# Patient Record
Sex: MALE | Race: WHITE | NOT HISPANIC OR LATINO | URBAN - METROPOLITAN AREA
[De-identification: names, ages, dates, MRNs, and addresses within clinical notes are randomized per-mention and may not be internally consistent; named-entity substitution may affect disease eponyms.]

---

## 2024-01-27 ENCOUNTER — HOSPITAL ENCOUNTER (EMERGENCY)
Facility: HOSPITAL | Age: 29
Discharge: OTHER INSTITUTION | End: 2024-01-27
Attending: STUDENT IN AN ORGANIZED HEALTH CARE EDUCATION/TRAINING PROGRAM
Payer: COMMERCIAL

## 2024-01-27 ENCOUNTER — APPOINTMENT (OUTPATIENT)
Dept: CARDIOLOGY | Facility: HOSPITAL | Age: 29
End: 2024-01-27
Payer: COMMERCIAL

## 2024-01-27 VITALS
TEMPERATURE: 98.1 F | BODY MASS INDEX: 24.37 KG/M2 | HEIGHT: 72 IN | SYSTOLIC BLOOD PRESSURE: 138 MMHG | HEART RATE: 132 BPM | OXYGEN SATURATION: 99 % | DIASTOLIC BLOOD PRESSURE: 84 MMHG | RESPIRATION RATE: 20 BRPM | WEIGHT: 179.9 LBS

## 2024-01-27 DIAGNOSIS — F19.10 SUBSTANCE ABUSE (MULTI): Primary | ICD-10-CM

## 2024-01-27 LAB
ALBUMIN SERPL-MCNC: 4.6 G/DL (ref 3.5–5)
ALP BLD-CCNC: 67 U/L (ref 35–125)
ALT SERPL-CCNC: 11 U/L (ref 5–40)
AMPHETAMINES UR QL SCN>1000 NG/ML: POSITIVE
ANION GAP SERPL CALC-SCNC: 16 MMOL/L
APAP SERPL-MCNC: <5 UG/ML
AST SERPL-CCNC: 35 U/L (ref 5–40)
BARBITURATES UR QL SCN>300 NG/ML: NEGATIVE
BASOPHILS # BLD AUTO: 0.06 X10*3/UL (ref 0–0.1)
BASOPHILS NFR BLD AUTO: 0.4 %
BENZODIAZ UR QL SCN>300 NG/ML: NEGATIVE
BILIRUB SERPL-MCNC: 0.5 MG/DL (ref 0.1–1.2)
BUN SERPL-MCNC: 26 MG/DL (ref 8–25)
BZE UR QL SCN>300 NG/ML: POSITIVE
CALCIUM SERPL-MCNC: 9.4 MG/DL (ref 8.5–10.4)
CANNABINOIDS UR QL SCN>50 NG/ML: POSITIVE
CHLORIDE SERPL-SCNC: 103 MMOL/L (ref 97–107)
CO2 SERPL-SCNC: 21 MMOL/L (ref 24–31)
CREAT SERPL-MCNC: 1.3 MG/DL (ref 0.4–1.6)
EGFRCR SERPLBLD CKD-EPI 2021: 77 ML/MIN/1.73M*2
EOSINOPHIL # BLD AUTO: 0 X10*3/UL (ref 0–0.7)
EOSINOPHIL NFR BLD AUTO: 0 %
ERYTHROCYTE [DISTWIDTH] IN BLOOD BY AUTOMATED COUNT: 13.1 % (ref 11.5–14.5)
ETHANOL SERPL-MCNC: <0.01 G/DL
FENTANYL+NORFENTANYL UR QL SCN: NEGATIVE
FLUAV RNA RESP QL NAA+PROBE: NOT DETECTED
FLUBV RNA RESP QL NAA+PROBE: NOT DETECTED
GLUCOSE SERPL-MCNC: 131 MG/DL (ref 65–99)
HCT VFR BLD AUTO: 42.2 % (ref 41–52)
HGB BLD-MCNC: 14.2 G/DL (ref 13.5–17.5)
IMM GRANULOCYTES # BLD AUTO: 0.09 X10*3/UL (ref 0–0.7)
IMM GRANULOCYTES NFR BLD AUTO: 0.5 % (ref 0–0.9)
LYMPHOCYTES # BLD AUTO: 2.92 X10*3/UL (ref 1.2–4.8)
LYMPHOCYTES NFR BLD AUTO: 17.7 %
MCH RBC QN AUTO: 30.5 PG (ref 26–34)
MCHC RBC AUTO-ENTMCNC: 33.6 G/DL (ref 32–36)
MCV RBC AUTO: 91 FL (ref 80–100)
METHADONE UR QL SCN>300 NG/ML: NEGATIVE
MONOCYTES # BLD AUTO: 1.54 X10*3/UL (ref 0.1–1)
MONOCYTES NFR BLD AUTO: 9.3 %
NEUTROPHILS # BLD AUTO: 11.88 X10*3/UL (ref 1.2–7.7)
NEUTROPHILS NFR BLD AUTO: 72.1 %
NRBC BLD-RTO: 0 /100 WBCS (ref 0–0)
OPIATES UR QL SCN>300 NG/ML: NEGATIVE
OXYCODONE UR QL: NEGATIVE
PCP UR QL SCN>25 NG/ML: NEGATIVE
PLATELET # BLD AUTO: 277 X10*3/UL (ref 150–450)
POTASSIUM SERPL-SCNC: 3.5 MMOL/L (ref 3.4–5.1)
PROT SERPL-MCNC: 7.4 G/DL (ref 5.9–7.9)
RBC # BLD AUTO: 4.66 X10*6/UL (ref 4.5–5.9)
SALICYLATES SERPL-MCNC: <0 MG/DL
SARS-COV-2 RNA RESP QL NAA+PROBE: NOT DETECTED
SODIUM SERPL-SCNC: 140 MMOL/L (ref 133–145)
WBC # BLD AUTO: 16.5 X10*3/UL (ref 4.4–11.3)

## 2024-01-27 PROCEDURE — 80320 DRUG SCREEN QUANTALCOHOLS: CPT

## 2024-01-27 PROCEDURE — 90839 PSYTX CRISIS INITIAL 60 MIN: CPT

## 2024-01-27 PROCEDURE — 85025 COMPLETE CBC W/AUTO DIFF WBC: CPT

## 2024-01-27 PROCEDURE — 36415 COLL VENOUS BLD VENIPUNCTURE: CPT

## 2024-01-27 PROCEDURE — 93005 ELECTROCARDIOGRAM TRACING: CPT

## 2024-01-27 PROCEDURE — 84075 ASSAY ALKALINE PHOSPHATASE: CPT

## 2024-01-27 PROCEDURE — 99285 EMERGENCY DEPT VISIT HI MDM: CPT

## 2024-01-27 PROCEDURE — 2500000001 HC RX 250 WO HCPCS SELF ADMINISTERED DRUGS (ALT 637 FOR MEDICARE OP): Performed by: EMERGENCY MEDICINE

## 2024-01-27 PROCEDURE — 80307 DRUG TEST PRSMV CHEM ANLYZR: CPT

## 2024-01-27 PROCEDURE — 87636 SARSCOV2 & INF A&B AMP PRB: CPT

## 2024-01-27 RX ORDER — LORAZEPAM 1 MG/1
2 TABLET ORAL ONCE
Status: COMPLETED | OUTPATIENT
Start: 2024-01-27 | End: 2024-01-27

## 2024-01-27 RX ORDER — LORAZEPAM 2 MG/ML
2 INJECTION INTRAMUSCULAR ONCE
Status: DISCONTINUED | OUTPATIENT
Start: 2024-01-27 | End: 2024-01-27

## 2024-01-27 RX ADMIN — LORAZEPAM 2 MG: 1 TABLET ORAL at 21:38

## 2024-01-27 SDOH — HEALTH STABILITY: MENTAL HEALTH: BEHAVIORS/MOOD: PLEASANT;COOPERATIVE;CALM;DELUSIONS;HALLUCINATIONS

## 2024-01-27 SDOH — ECONOMIC STABILITY: HOUSING INSECURITY: FEELS SAFE LIVING IN HOME: OTHER (COMMENT)

## 2024-01-27 SDOH — HEALTH STABILITY: MENTAL HEALTH: ANXIETY SYMPTOMS: NO PROBLEMS REPORTED OR OBSERVED.

## 2024-01-27 SDOH — HEALTH STABILITY: MENTAL HEALTH: HALLUCINATION: AUDITORY;VISUAL

## 2024-01-27 SDOH — SOCIAL STABILITY: SOCIAL NETWORK: PARENT/GUARDIAN/SIGNIFICANT OTHER INVOLVEMENT: NO INVOLVEMENT

## 2024-01-27 SDOH — HEALTH STABILITY: MENTAL HEALTH: HAVE YOU WISHED YOU WERE DEAD OR WISHED YOU COULD GO TO SLEEP AND NOT WAKE UP?: NO

## 2024-01-27 SDOH — HEALTH STABILITY: MENTAL HEALTH: SUICIDE ASSESSMENT: ADULT (C-SSRS)

## 2024-01-27 SDOH — HEALTH STABILITY: MENTAL HEALTH: HAVE YOU EVER DONE ANYTHING, STARTED TO DO ANYTHING, OR PREPARED TO DO ANYTHING TO END YOUR LIFE?: NO

## 2024-01-27 SDOH — HEALTH STABILITY: MENTAL HEALTH: HAVE YOU ACTUALLY HAD ANY THOUGHTS OF KILLING YOURSELF?: NO

## 2024-01-27 SDOH — HEALTH STABILITY: MENTAL HEALTH: DEPRESSION SYMPTOMS: NO PROBLEMS REPORTED OR OBSERVED.

## 2024-01-27 ASSESSMENT — LIFESTYLE VARIABLES
SUBSTANCE_ABUSE_PAST_12_MONTHS: YES
PRESCIPTION_ABUSE_PAST_12_MONTHS: NO
EVER FELT BAD OR GUILTY ABOUT YOUR DRINKING: NO
HAVE PEOPLE ANNOYED YOU BY CRITICIZING YOUR DRINKING: NO
EVER HAD A DRINK FIRST THING IN THE MORNING TO STEADY YOUR NERVES TO GET RID OF A HANGOVER: NO
HAVE YOU EVER FELT YOU SHOULD CUT DOWN ON YOUR DRINKING: NO
REASON UNABLE TO ASSESS: NO

## 2024-01-27 ASSESSMENT — COLUMBIA-SUICIDE SEVERITY RATING SCALE - C-SSRS
6. HAVE YOU EVER DONE ANYTHING, STARTED TO DO ANYTHING, OR PREPARED TO DO ANYTHING TO END YOUR LIFE?: NO
2. HAVE YOU ACTUALLY HAD ANY THOUGHTS OF KILLING YOURSELF?: NO
1. IN THE PAST MONTH, HAVE YOU WISHED YOU WERE DEAD OR WISHED YOU COULD GO TO SLEEP AND NOT WAKE UP?: NO

## 2024-01-27 ASSESSMENT — PAIN - FUNCTIONAL ASSESSMENT: PAIN_FUNCTIONAL_ASSESSMENT: 0-10

## 2024-01-27 ASSESSMENT — PAIN SCALES - GENERAL: PAINLEVEL_OUTOF10: 0 - NO PAIN

## 2024-01-27 NOTE — PROGRESS NOTES
EPAT - Social Work Psychiatric Assessment    Arrival Details  Mode of Arrival: Other (Comment) (Police)  Admission Source: Other (Comment) (Pulled over in the community)  Admission Type: Voluntary  EPAT Assessment Start Date: 01/27/24  EPAT Assessment Start Time: 1200  Name of : Manasa Coburn    History of Present Illness  Admission Reason: Police (PT is accused of inducing panic at a local bank and possibly alluding to wanting to commit a robbery. PT was found by police in the community and had ingested an unknown subtance in front of the officers.)  HPI: SW reviewed records and met with pt to obtain history. Due to altered mental status- mental history was unable to be assessed. PT is documented as schizophrenic and has recieved services through [facility] although it appears he is currently non-compliant- his last visit was from October 2023. From previous records it appears that pt has had similar episodes of psychosis and delusions.     Readmission Information   Readmission within 30 Days: No    Psychiatric Symptoms  Anxiety Symptoms: No problems reported or observed.  Depression Symptoms: No problems reported or observed.  Gia Symptoms: Flight of ideas, Grandiosity, Increased energy    Psychosis Symptoms  Hallucination Type: No problems reported or observed.  Delusion Type: Thought insertion, Grandeur, Mind reading    Additional Symptoms - Adult  Generalized Anxiety Disorder: No problems reported or observed.  Obsessive Compulsive Disorder: No problems reported or observed.  Panic Attack: No problems reported or observed.  Post Traumatic Stress Disorder: No problems reported or observed.  Delirium: Acute inattention    Past Psychiatric History/Meds/Treatments  Past Psychiatric History: PT is documented as schizophrenic. Pt reported that he has providers through [facility] however records indicate that he as not been seen in the last three months for services.  Past Psychiatric  "Meds/Treatments: [place], 2023 and [facility] 2022    Current Mental Health Contacts   Name/Phone Number: None  Provider Name/Phone Number: None    Support System:  (Pt stated that his \"paw paw\" and his dog \"tugger\" are his supports. When asked clarifying questions about his \"paw paw\" he was unable to clairfy.)    Living Arrangement:  (When asked about living arrangements, pt stated that he lives with \"a lot of people\" and \"hes never home alone\" but could not provide more information.)    Home Safety  Feels Safe Living in Home: Other (Comment) (Unable to assess)         [facility] Service/Education History  Current or Previous  Service: None         Legal  Criminal Activity/ Legal Involvement Pertinent to Current Situation/ Hospitalization: PT was brought in by PD- charges are pending as a result of inducing panic in public.    Drug Screening  Have you used any substances (canabis, cocaine, heroin, hallucinogens, inhalants, etc.) in the past 12 months?: Yes  Have you used any prescription drugs other than prescribed in the past 12 months?: No  Is a toxicology screen needed?: Yes    Stage of Change  Stage of Change: Precontemplation    Psychosocial  Psychosocial (WDL): Exceptions to WDL  Behaviors/Mood: Appears intoxicated, Calm, Cooperative, Delusions, Impulsive  Affect: Inconsistent with thought content  Parent/Guardian/Significant Other Involvement: No involvement    Orientation  Orientation Level: Disoriented to place, Disoriented to person    General Appearance  Motor Activity: Unremarkable  Speech Pattern: Rapid, Word salad, Pressured  General Attitude: Cooperative  Appearance/Hygiene: Body odor    Thought Process  Coherency: Flight of ideas, Disorganized, Loose associations, Tangential  Content: Delusions  Delusions: Grandeur  Perception: Hallucinations  Hallucination: Visual  Judgment/Insight: Poor  Confusion: Moderate  Cognition: Poor attention/concentration    Sleep Pattern  Sleep " Pattern: Unable to assess    Risk Factors  Self Harm/Suicidal Ideation Plan: None reported  Previous Self Harm/Suicidal Plans: None reported    Violence Risk Assessment  Assessment of Violence: None noted  Thoughts of Harm to Others: No    Ability to Assess Risk Screen  Risk Screen - Ability to Assess: Unable to assess  Step 1: Risk Factors  Current & Past Psychiatric Dx: Psychotic disorder, Mood disorder, Alcohol/substance abuse disorders  Presenting Symptoms: Psychosis  Change in Treatment: Non-compliant or not receiving treatment  Access to L[employer] : Other (Comment) (Unable to assess)  Step 3: Suicidal Ideation Intensity  Most Severe Suicidal Ideation Identified: Unable to assess  Step 5: Documentation  Risk Level: Low suicide risk    Psychiatric Impression and Plan of Care  Assessment and Plan: SW met with pt. Pt was not well oriented- he reported that he believed this year to be 1972. He also stated that he believes that he was in a movie  Pt overall seemed gradiose and was experiencing phychosis. PT tested positive for THC, coacaine and amphetamines. Assessment qauality was limited due to pt's altereed mental status.  Plan Comments: SW consulted with attending provider and RN. Disposition reccomendation is unclear at this time. Will be dependent on pt's mental status rapidly resolving due to drug effects and if he would not meet psych criteria. If not imporving symptoms, will consider inpatient psych admission.    Outcome/Disposition  Patient's Perception of Outcome Achieved: Unable to assess  Assessment, Recommendations and Risk Level Reviewed with: Dr. Ahmet COVINGTON, Danielle Thurman PA-C, Becky OTT  Contact Name: N/A  EPAT Assessment Completed Date: 01/27/24  EPAT Assessment Completed Time: 1948

## 2024-01-27 NOTE — PROGRESS NOTES
"Social Work Note    SW attempted to engage pt in conversation about how he is feeling.  Pt reported feeling \"elevated\".  SW inquired about what elevated feels like to pt.  Pt replied by telling SW a story about brownie pie in his mother's refrigerator. SW found pt to be presenting with disorganized speech, needing redirection after every question and mumbling nonsensically.  Pt was not able to give a direct answer to any of the questions SW asked. When asked a question pt would give answers that had no connection to the question asked. Pt presented as having auditory hallucinations, asking SW if she hears things in the hallway.  SW told pt that yes, there are voices outside his door to which pt stated, \"no, I hear more than that\". SW inquired about pt mental health history, hospitalizations and diagnosis to which pt reports he has none.  Pt reports no AH/VH at this time and denies SI/HI.  Pt reports having things to look forward to but when asked by SW what he is looking forward to he stated, \"planet fitness\". After SW left the room, pt was clearly talking to himself loud enough to be heard at the nurses station.  After speaking with the pt, SW reports pt mental status has not improved since being admitted to the ED.  Pt is still presenting disorganized and his thinking is non-liner. SW will also note that pt presentation does not match up with methamphetamine induced psychosis. Pt is not being forthcoming with his mental health history which leads SW to believe he has poor insight and from the report EPD gave, pt is presenting with poor judgement.      "

## 2024-01-27 NOTE — ED PROVIDER NOTES
HPI   Chief Complaint   Patient presents with    Drug Overdose       Patient is a 28-year-old male presenting to the emergency department for evaluation of possible ingestion.  Reportedly PD was called by a local bank due to a gentleman in the bank saying that he was going to nola it and saying things about having cameras in a vault.  The patient was pulled over by PD and was arrested.  Apparently they went to arrest him at length like he had possibly ingested a substance.  He then became more agitated after being arrested and therefore PD was concerned for overdose.  Patient states he thought that they were filming a movie and the  were part of the act.  Patient denies any homicidal or suicidal ideations.                          Chelsea Coma Scale Score: 15                  Patient History   Past Medical History:   Diagnosis Date    Schizophrenia (CMS/[facility])     Substance abuse (CMS/[facility])      History reviewed. No pertinent surgical history.  No family history on file.  Social History     Tobacco Use    Smoking status: Never    Smokeless tobacco: Never   Substance Use Topics    Alcohol use: Not on file    Drug use: Not on file       Physical Exam   ED Triage Vitals [01/27/24 1124]   Temperature Heart Rate Respirations BP   36.6 °C (97.9 °F) (!) 150 20 (!) 156/98      Pulse Ox Temp src Heart Rate Source Patient Position   99 % -- Monitor --      BP Location FiO2 (%)     -- --       Physical Exam  Vitals and nursing note reviewed.   Constitutional:       General: He is not in acute distress.     Appearance: Normal appearance. He is normal weight. He is not ill-appearing or toxic-appearing.      Comments: Disheveled appearing   HENT:      Head: Normocephalic and atraumatic.      Nose: Nose normal.      Mouth/Throat:      Mouth: Mucous membranes are moist.   Eyes:      Extraocular Movements: Extraocular movements intact.      Pupils: Pupils are equal, round, and reactive to light.   Cardiovascular:      Rate and  Rhythm: Regular rhythm. Tachycardia present.      Pulses: Normal pulses.      Heart sounds: Normal heart sounds. No murmur heard.     No friction rub. No gallop.   Pulmonary:      Effort: Pulmonary effort is normal.      Breath sounds: Normal breath sounds.   Musculoskeletal:         General: Normal range of motion.      Cervical back: Normal range of motion.   Skin:     General: Skin is warm and dry.   Neurological:      General: No focal deficit present.      Mental Status: He is alert and oriented to person, place, and time.   Psychiatric:         Speech: Speech is rapid and pressured.         Behavior: Behavior is hyperactive.         Thought Content: Thought content is delusional. Thought content does not include homicidal or suicidal ideation.      Comments: Appears to be internally stimulated         ED Course & MDM   ED Course as of 01/27/24 1750   Sat Jan 27, 2024   1030 EKG presented to me at 10:30 AM     EKG as interpreted by me shows normal sinus tachycardia at a rate of 147 bpm, likely left ventricular hypertrophy, no STEMI, normal axis.   [DH]   1037 Attending MDM:  28-year-old male past medical history of questionable psychiatric disease presents to the emergency room with possible ingestion.      Independent historian: Reportedly PD notes that they were called for a gentleman in a bank saying things like do have cameras in the vault and he believes himself to be in a movie and he is making a lot of different claims.  When they arrested him patient ingested an unknown substance and since then has become more agitated.  Patient otherwise denying any suicidal or homicidal ideation at this time and otherwise appears delusional and seems to be responding to internal stimuli.    [unfilled]  Vital signs as interpreted by me: Tachycardic    Constitutional: No acute distress. Resting comfortably.   Head: Normocephalic, atraumatic.   Eyes: Pupils dilated bilaterally, minimally reactive to light, EOM  grossly intact, conjunctiva normal.  Mouth/Throat: Oropharynx is clear, moist mucus membranes.   Neck: Supple. No lymphadenopathy.  Cardiovascular: Regular rate and regular rhythm. Extremities are well-perfused.    Pulmonary/Chest: No respiratory distress, breathing comfortably on room air.    Abdominal: Soft, non-tender, non-distended. No rebound or guarding.   Musculoskeletal: No lower extremity edema.       Skin: Warm, dry, and intact.   Neurological: Patient is oriented to person, place, time, and situation. Face symmetric, hearing intact to voice, speech normal. Moves all extremities.   Psych: Agitated mood, agitated affect, thought content bizarre, and judgment questionable.    Differential includes but not limited to:  Drug intoxication versus drug withdrawal versus acute jade [DH]      ED Course User Index  [DH] Donte Leo MD         Diagnoses as of 01/27/24 1750   Substance abuse (CMS/[facility])       Medical Decision Making  Parts of this chart have been completed using voice recognition software. Please excuse any errors of transcription. Despite the medical decision making time stamp above-my medical decision making has taken place during the patient's entire visit. My thought process and reason for plan has been formulated from the time that I saw the patient until the time of disposition and is not specific to one specific moment during their visit and furthermore my MDM encompasses this entire chart and not only this text box.    Patient seen in conjunction with attending physician Dr. Leo.    HPI: Detailed above.    Exam: A medically appropriate exam performed, outlined above, given the known history and presentation.    History obtained from: Patient and PD    EKG: Reviewed and interpreted by my attending physician    Labs/Diagnostics:  Labs Reviewed   CBC WITH AUTO DIFFERENTIAL - Abnormal       Result Value    WBC 16.5 (*)     nRBC 0.0      RBC 4.66      Hemoglobin 14.2      Hematocrit 42.2      MCV 91       MCH 30.5      MCHC 33.6      RDW 13.1      Platelets 277      Neutrophils % 72.1      Immature Granulocytes %, Automated 0.5      Lymphocytes % 17.7      Monocytes % 9.3      Eosinophils % 0.0      Basophils % 0.4      Neutrophils Absolute 11.88 (*)     Immature Granulocytes Absolute, Automated 0.09      Lymphocytes Absolute 2.92      Monocytes Absolute 1.54 (*)     Eosinophils Absolute 0.00      Basophils Absolute 0.06     COMPREHENSIVE METABOLIC PANEL - Abnormal    Glucose 131 (*)     Sodium 140      Potassium 3.5      Chloride 103      Bicarbonate 21 (*)     Urea Nitrogen 26 (*)     Creatinine 1.30      eGFR 77      Calcium 9.4      Albumin 4.6      Alkaline Phosphatase 67      Total Protein 7.4      AST 35      Bilirubin, Total 0.5      ALT 11      Anion Gap 16     DRUG SCREEN,URINE - Abnormal    Amphetamine Screen, Urine Positive (*)     Barbiturate Screen, Urine Negative      Benzodiazepines Screen, Urine Negative      Cannabinoid Screen, Urine Positive (*)     Cocaine Metabolite Screen, Urine Positive (*)     Fentanyl Screen, Urine Negative      Methadone Screen, Urine Negative      Opiate Screen, Urine Negative      Oxycodone Screen, Urine Negative      PCP Screen, Urine Negative      Narrative:     These toxicological screening tests provide unconfirmed qualitative measurements to aid in treatment and diagnosis in cases of drug use or overdose. This test is used only for medical purposes. A positive result does not indicate or measure intoxication. For specific test performance or pathologist consultation, please contact the Laboratory.    The following threshold concentrations are used for these analyses.Values at or above the threshold concentration are reported as positive. Values below the threshold are reported as negative.    Drug /Screening Threshold                                                                                                 THC/CANNABINOIDS................50  ng/ml  METHADONE......................300 ng/ml  COCAINE METABOLITES............300 ng/ml  BENZODIAZEPINE.................300 ng/ml  PCP.............................25 ng/ml  OPIATE.........................300 ng/ml  AMPHETAMINE/ECSTASY...........1000 ng/ml  BARBITURATE....................200 ng/ml  OXYCODONE......................100 ng/ml  FENTANYL.........................5 ng/ml       ACUTE TOXICOLOGY PANEL, BLOOD - Normal    Acetaminophen <5.0      Salicylate  <0      Alcohol <0.010     SARS-COV-2 AND INFLUENZA A/B PCR - Normal    Flu A Result Not Detected      Flu B Result Not Detected      Coronavirus 2019, PCR Not Detected      Narrative:     This assay has received FDA Emergency Use Authorization (EUA) and  is only authorized for the duration of time that circumstances exist to justify the authorization of the emergency use of in vitro diagnostic tests for the detection of SARS-CoV-2 virus and/or diagnosis of COVID-19 infection under section 564(b)(1) of the Act, 21 U.S.C. 360bbb-3(b)(1). Testing for SARS-CoV-2 is only recommended for patients who meet current clinical and/or epidemiological criteria as defined by federal, state, or local public health directives. This assay is an in vitro diagnostic nucleic acid amplification test for the qualitative detection of SARS-CoV-2, Influenza A, and Influenza B from nasopharyngeal specimens and has been validated for use at [facility]. Negative results do not preclude COVID-19 infections or Influenza A/B infections, and should not be used as the sole basis for diagnosis, treatment, or other management decisions. If Influenza A/B and RSV PCR results are negative, testing for Parainfluenza virus, Adenovirus and Metapneumovirus is routinely performed for [facility] pediatric oncology and intensive care inpatients, and is available on other patients by placing an add-on request.      EMERGENCY DEPARTMENT COURSE and DIFFERENTIAL DIAGNOSIS/MDM:  Patient  is a 28-year-old male presenting to the emergency department for evaluation of possible overdose.  On physical exam vital signs remarkable for hypertension and tachycardia but otherwise stable.  Patient appears internally stimulated and delusional.  Diagnostic labs ordered as well as social work consult.  Patient tested negative for COVID and flu.  She has a positive in his urine for amphetamines, cannabinoid, and cocaine.  CMP showed no electrolyte abnormalities.  Acute toxicology panel normal.  CBC showed a leukocytosis but otherwise unremarkable.  Patient pending repeat social work evaluation.  Is reached on my shift and therefore continuation of care as well as final disposition will be determined by attending physician in the emergency department.      Vitals:    Vitals:    01/27/24 1124 01/27/24 1228   BP: (!) 156/98    Pulse: (!) 150 (!) 140   Resp: 20    Temp: 36.6 °C (97.9 °F)    SpO2: 99%    Weight: 81.6 kg (179 lb 14.3 oz)    Height: 1.829 m (6')        History Limited by:    Behavior    Independent history obtained from:    Law Enforcement      External records reviewed:    None      Diagnostics interpreted by me:    None      Discussions with other clinicians:    Social Work Leah      Chronic conditions impacting care:    None      Social determinants of health affecting care:    Drug Addiction    ED Medications managed:    Medications - No data to display    Procedure  Procedures     Danielle Thurman PA-C  01/27/24 9268

## 2024-01-27 NOTE — ED PROVIDER NOTES
CHERRY met with NEERAJ for a meet and greet . According to PD pt  went into a local bank and threaten to nola them . The bank called PD and PD pulled pt over on the side of the road . When PD pulled pt over he ingested an unknown substance from a plastic bag and began sweating and acting bizarre. NEERAJ is requesting medical clearance due to possible drug overdose . CHERRY will assessed once medically cleared .

## 2024-01-27 NOTE — ED NOTES
Pt is on cardiac monitor, in green disposable gown and is placed on life pack machine. Cabinets are locked, room is clear. Pt denies SI/HI at this tiime     Ryann Mullins RN  01/27/24 8495

## 2024-01-27 NOTE — ED TRIAGE NOTES
"PD was called bc the pt was seen in a bank showing odd behavior. PD was called and when pt saw PD, he ripped open a ziploc bag with an unknown substance in it. The pt swallowed the contents. Pt will not admit to what the substance was, just states it was \"jello powder\". Pts pupils are heavily dilated and pt is showing pressured speech and is diaphoretic. Pts hr is 150 bpm and is on the cardiac monitor. Pt is hearing voices and he believes he is in a movie, pts clothes are dirty and wet. Ptp denies SI/HI at this time   "

## 2024-01-28 NOTE — ED NOTES
Oral dose of ativan given due to pt HR of 140. Pts HR at time of transfer is 132, per ED MD and [facility] nurse pt is stable for transfer.     Bhargavi Kamara, MARCON  01/27/24 1278

## 2024-01-28 NOTE — ED NOTES
Pts sister called requesting an update on pt stating she was his guardian and that we should already have paperwork on file. Per social work we have no guardianship paperwork.  Pt verbalized that he does not give permission for any updates to be given to family members if they call and does not want any visitors at this time     Bhargavi Kamara LPN  01/27/24 2010

## 2024-01-29 LAB
ATRIAL RATE: 147 BPM
P AXIS: 62 DEGREES
P OFFSET: 209 MS
P ONSET: 158 MS
PR INTERVAL: 122 MS
Q ONSET: 219 MS
QRS COUNT: 24 BEATS
QRS DURATION: 84 MS
QT INTERVAL: 272 MS
QTC CALCULATION(BAZETT): 425 MS
QTC FREDERICIA: 366 MS
R AXIS: 74 DEGREES
T AXIS: -4 DEGREES
T OFFSET: 355 MS
VENTRICULAR RATE: 147 BPM

## 2024-02-08 ENCOUNTER — APPOINTMENT (OUTPATIENT)
Dept: CARDIOLOGY | Facility: HOSPITAL | Age: 29
End: 2024-02-08
Payer: COMMERCIAL

## 2024-02-08 ENCOUNTER — HOSPITAL ENCOUNTER (EMERGENCY)
Facility: HOSPITAL | Age: 29
Discharge: HOME | End: 2024-02-08
Attending: STUDENT IN AN ORGANIZED HEALTH CARE EDUCATION/TRAINING PROGRAM
Payer: COMMERCIAL

## 2024-02-08 VITALS
HEIGHT: 72 IN | WEIGHT: 180 LBS | OXYGEN SATURATION: 98 % | DIASTOLIC BLOOD PRESSURE: 76 MMHG | BODY MASS INDEX: 24.38 KG/M2 | SYSTOLIC BLOOD PRESSURE: 141 MMHG | RESPIRATION RATE: 18 BRPM | TEMPERATURE: 99.2 F | HEART RATE: 100 BPM

## 2024-02-08 DIAGNOSIS — T43.611A: Primary | ICD-10-CM

## 2024-02-08 LAB
ALBUMIN SERPL-MCNC: 4.7 G/DL (ref 3.5–5)
ALP BLD-CCNC: 78 U/L (ref 35–125)
ALT SERPL-CCNC: 41 U/L (ref 5–40)
AMPHETAMINES UR QL SCN>1000 NG/ML: NEGATIVE
ANION GAP SERPL CALC-SCNC: 16 MMOL/L
APAP SERPL-MCNC: <5 UG/ML
APPEARANCE UR: CLEAR
AST SERPL-CCNC: 26 U/L (ref 5–40)
BARBITURATES UR QL SCN>300 NG/ML: NEGATIVE
BASOPHILS # BLD AUTO: 0.09 X10*3/UL (ref 0–0.1)
BASOPHILS NFR BLD AUTO: 0.7 %
BENZODIAZ UR QL SCN>300 NG/ML: NEGATIVE
BILIRUB SERPL-MCNC: <0.2 MG/DL (ref 0.1–1.2)
BILIRUB UR STRIP.AUTO-MCNC: NEGATIVE MG/DL
BUN SERPL-MCNC: 16 MG/DL (ref 8–25)
BZE UR QL SCN>300 NG/ML: NEGATIVE
CALCIUM SERPL-MCNC: 9.2 MG/DL (ref 8.5–10.4)
CANNABINOIDS UR QL SCN>50 NG/ML: POSITIVE
CHLORIDE SERPL-SCNC: 103 MMOL/L (ref 97–107)
CK SERPL-CCNC: 88 U/L (ref 24–195)
CO2 SERPL-SCNC: 22 MMOL/L (ref 24–31)
COLOR UR: NORMAL
CREAT SERPL-MCNC: 1.1 MG/DL (ref 0.4–1.6)
EGFRCR SERPLBLD CKD-EPI 2021: >90 ML/MIN/1.73M*2
EOSINOPHIL # BLD AUTO: 0.02 X10*3/UL (ref 0–0.7)
EOSINOPHIL NFR BLD AUTO: 0.2 %
ERYTHROCYTE [DISTWIDTH] IN BLOOD BY AUTOMATED COUNT: 13.2 % (ref 11.5–14.5)
ETHANOL SERPL-MCNC: <0.01 G/DL
FENTANYL+NORFENTANYL UR QL SCN: NEGATIVE
GLUCOSE SERPL-MCNC: 121 MG/DL (ref 65–99)
GLUCOSE UR STRIP.AUTO-MCNC: NORMAL MG/DL
HCT VFR BLD AUTO: 44.4 % (ref 41–52)
HGB BLD-MCNC: 14.7 G/DL (ref 13.5–17.5)
HYALINE CASTS #/AREA URNS AUTO: ABNORMAL /LPF
IMM GRANULOCYTES # BLD AUTO: 0.07 X10*3/UL (ref 0–0.7)
IMM GRANULOCYTES NFR BLD AUTO: 0.6 % (ref 0–0.9)
KETONES UR STRIP.AUTO-MCNC: NEGATIVE MG/DL
LACTATE BLDV-SCNC: 2.7 MMOL/L (ref 0.4–2)
LACTATE BLDV-SCNC: 3.2 MMOL/L (ref 0.4–2)
LEUKOCYTE ESTERASE UR QL STRIP.AUTO: NEGATIVE
LYMPHOCYTES # BLD AUTO: 2.95 X10*3/UL (ref 1.2–4.8)
LYMPHOCYTES NFR BLD AUTO: 23.2 %
MAGNESIUM SERPL-MCNC: 1.9 MG/DL (ref 1.6–3.1)
MCH RBC QN AUTO: 30.8 PG (ref 26–34)
MCHC RBC AUTO-ENTMCNC: 33.1 G/DL (ref 32–36)
MCV RBC AUTO: 93 FL (ref 80–100)
METHADONE UR QL SCN>300 NG/ML: NEGATIVE
MONOCYTES # BLD AUTO: 0.94 X10*3/UL (ref 0.1–1)
MONOCYTES NFR BLD AUTO: 7.4 %
MUCOUS THREADS #/AREA URNS AUTO: ABNORMAL /LPF
NEUTROPHILS # BLD AUTO: 8.65 X10*3/UL (ref 1.2–7.7)
NEUTROPHILS NFR BLD AUTO: 67.9 %
NITRITE UR QL STRIP.AUTO: NEGATIVE
NRBC BLD-RTO: 0 /100 WBCS (ref 0–0)
OPIATES UR QL SCN>300 NG/ML: NEGATIVE
OXYCODONE UR QL: NEGATIVE
PCP UR QL SCN>25 NG/ML: NEGATIVE
PH UR STRIP.AUTO: 5.5 [PH]
PLATELET # BLD AUTO: 382 X10*3/UL (ref 150–450)
POTASSIUM SERPL-SCNC: 3.3 MMOL/L (ref 3.4–5.1)
PROT SERPL-MCNC: 7.9 G/DL (ref 5.9–7.9)
PROT UR STRIP.AUTO-MCNC: NORMAL MG/DL
RBC # BLD AUTO: 4.78 X10*6/UL (ref 4.5–5.9)
RBC # UR STRIP.AUTO: NEGATIVE /UL
RBC #/AREA URNS AUTO: ABNORMAL /HPF
SALICYLATES SERPL-MCNC: <0 MG/DL
SODIUM SERPL-SCNC: 141 MMOL/L (ref 133–145)
SP GR UR STRIP.AUTO: 1.02
UROBILINOGEN UR STRIP.AUTO-MCNC: NORMAL MG/DL
WBC # BLD AUTO: 12.7 X10*3/UL (ref 4.4–11.3)
WBC #/AREA URNS AUTO: ABNORMAL /HPF

## 2024-02-08 PROCEDURE — 80307 DRUG TEST PRSMV CHEM ANLYZR: CPT | Performed by: STUDENT IN AN ORGANIZED HEALTH CARE EDUCATION/TRAINING PROGRAM

## 2024-02-08 PROCEDURE — 83605 ASSAY OF LACTIC ACID: CPT | Performed by: STUDENT IN AN ORGANIZED HEALTH CARE EDUCATION/TRAINING PROGRAM

## 2024-02-08 PROCEDURE — 36415 COLL VENOUS BLD VENIPUNCTURE: CPT | Performed by: STUDENT IN AN ORGANIZED HEALTH CARE EDUCATION/TRAINING PROGRAM

## 2024-02-08 PROCEDURE — 80179 DRUG ASSAY SALICYLATE: CPT | Performed by: STUDENT IN AN ORGANIZED HEALTH CARE EDUCATION/TRAINING PROGRAM

## 2024-02-08 PROCEDURE — 85025 COMPLETE CBC W/AUTO DIFF WBC: CPT | Performed by: STUDENT IN AN ORGANIZED HEALTH CARE EDUCATION/TRAINING PROGRAM

## 2024-02-08 PROCEDURE — 80143 DRUG ASSAY ACETAMINOPHEN: CPT | Performed by: STUDENT IN AN ORGANIZED HEALTH CARE EDUCATION/TRAINING PROGRAM

## 2024-02-08 PROCEDURE — 81001 URINALYSIS AUTO W/SCOPE: CPT | Mod: 59 | Performed by: STUDENT IN AN ORGANIZED HEALTH CARE EDUCATION/TRAINING PROGRAM

## 2024-02-08 PROCEDURE — 93005 ELECTROCARDIOGRAM TRACING: CPT

## 2024-02-08 PROCEDURE — 2500000004 HC RX 250 GENERAL PHARMACY W/ HCPCS (ALT 636 FOR OP/ED): Performed by: STUDENT IN AN ORGANIZED HEALTH CARE EDUCATION/TRAINING PROGRAM

## 2024-02-08 PROCEDURE — 96374 THER/PROPH/DIAG INJ IV PUSH: CPT

## 2024-02-08 PROCEDURE — 96361 HYDRATE IV INFUSION ADD-ON: CPT

## 2024-02-08 PROCEDURE — 99284 EMERGENCY DEPT VISIT MOD MDM: CPT | Mod: 25

## 2024-02-08 PROCEDURE — 82077 ASSAY SPEC XCP UR&BREATH IA: CPT | Performed by: STUDENT IN AN ORGANIZED HEALTH CARE EDUCATION/TRAINING PROGRAM

## 2024-02-08 PROCEDURE — 83735 ASSAY OF MAGNESIUM: CPT | Performed by: STUDENT IN AN ORGANIZED HEALTH CARE EDUCATION/TRAINING PROGRAM

## 2024-02-08 PROCEDURE — 84075 ASSAY ALKALINE PHOSPHATASE: CPT | Performed by: STUDENT IN AN ORGANIZED HEALTH CARE EDUCATION/TRAINING PROGRAM

## 2024-02-08 PROCEDURE — 99284 EMERGENCY DEPT VISIT MOD MDM: CPT | Mod: 25 | Performed by: STUDENT IN AN ORGANIZED HEALTH CARE EDUCATION/TRAINING PROGRAM

## 2024-02-08 PROCEDURE — 82550 ASSAY OF CK (CPK): CPT | Performed by: STUDENT IN AN ORGANIZED HEALTH CARE EDUCATION/TRAINING PROGRAM

## 2024-02-08 RX ORDER — LORAZEPAM 2 MG/ML
2 INJECTION INTRAMUSCULAR ONCE
Status: COMPLETED | OUTPATIENT
Start: 2024-02-08 | End: 2024-02-08

## 2024-02-08 RX ORDER — PROPRANOLOL HYDROCHLORIDE 20 MG/1
20 TABLET ORAL ONCE
Status: COMPLETED | OUTPATIENT
Start: 2024-02-08 | End: 2024-02-08

## 2024-02-08 RX ADMIN — LORAZEPAM 2 MG: 2 INJECTION, SOLUTION INTRAMUSCULAR; INTRAVENOUS at 18:04

## 2024-02-08 RX ADMIN — PROPRANOLOL HYDROCHLORIDE 20 MG: 20 TABLET ORAL at 19:49

## 2024-02-08 RX ADMIN — SODIUM CHLORIDE 1000 ML: 9 INJECTION, SOLUTION INTRAVENOUS at 16:10

## 2024-02-08 RX ADMIN — SODIUM CHLORIDE 1000 ML: 900 INJECTION, SOLUTION INTRAVENOUS at 14:37

## 2024-02-08 ASSESSMENT — COLUMBIA-SUICIDE SEVERITY RATING SCALE - C-SSRS
2. HAVE YOU ACTUALLY HAD ANY THOUGHTS OF KILLING YOURSELF?: NO
6. HAVE YOU EVER DONE ANYTHING, STARTED TO DO ANYTHING, OR PREPARED TO DO ANYTHING TO END YOUR LIFE?: NO
1. IN THE PAST MONTH, HAVE YOU WISHED YOU WERE DEAD OR WISHED YOU COULD GO TO SLEEP AND NOT WAKE UP?: NO

## 2024-02-08 ASSESSMENT — LIFESTYLE VARIABLES
HAVE YOU EVER FELT YOU SHOULD CUT DOWN ON YOUR DRINKING: NO
HAVE PEOPLE ANNOYED YOU BY CRITICIZING YOUR DRINKING: NO
EVER HAD A DRINK FIRST THING IN THE MORNING TO STEADY YOUR NERVES TO GET RID OF A HANGOVER: NO
EVER FELT BAD OR GUILTY ABOUT YOUR DRINKING: NO

## 2024-02-08 ASSESSMENT — PAIN - FUNCTIONAL ASSESSMENT: PAIN_FUNCTIONAL_ASSESSMENT: 0-10

## 2024-02-08 ASSESSMENT — PAIN SCALES - GENERAL: PAINLEVEL_OUTOF10: 0 - NO PAIN

## 2024-02-08 NOTE — ED TRIAGE NOTES
[facility] said he took a handful of caffeine pills (possibly 3 200mg). Said he also had a bunch of energy drinks. HR 160s-170s upon EMS arrival. Pt with hx of schizophrenia, with pressured speech and agitated.

## 2024-02-09 LAB — HOLD SPECIMEN: NORMAL

## 2024-02-09 NOTE — ED PROVIDER NOTES
HPI   Chief Complaint   Patient presents with    Rapid Heart Rate    Drug Overdose       28-year-old male presents with ingestion.  Patient presented to [facility] to  his mental health medications and while there he reportedly took several tablets.  Patient states that he took 1 caffeine tablet.  He denies suicidal intent.  He denies ingestion of other drugs or alcohol.  He has no headache, chest pain, abdominal pain.  No nausea, vomiting, diarrhea.  Denies SI, HI, auditory visual hallucinations.                          Silvia Coma Scale Score: 15                     Patient History   Past Medical History:   Diagnosis Date    Schizophrenia (CMS/[facility])     Substance abuse (CMS/[facility])      No past surgical history on file.  No family history on file.  Social History     Tobacco Use    Smoking status: Never    Smokeless tobacco: Never   Substance Use Topics    Alcohol use: Not on file    Drug use: Not on file       Physical Exam   ED Triage Vitals   Temperature Heart Rate Respirations BP   02/08/24 1813 02/08/24 1427 02/08/24 1427 02/08/24 1427   37.3 °C (99.2 °F) (!) 155 17 139/78      Pulse Ox Temp Source Heart Rate Source Patient Position   02/08/24 1427 02/08/24 1813 02/08/24 1913 02/08/24 1427   100 % Oral Monitor Sitting      BP Location FiO2 (%)     02/08/24 1427 --     Left arm        Physical Exam  Vitals and nursing note reviewed.   Constitutional:       General: He is not in acute distress.     Appearance: He is not ill-appearing.   HENT:      Head: Normocephalic and atraumatic.      Mouth/Throat:      Mouth: Mucous membranes are moist.      Pharynx: Oropharynx is clear.   Eyes:      Extraocular Movements: Extraocular movements intact.      Conjunctiva/sclera: Conjunctivae normal.      Pupils: Pupils are equal, round, and reactive to light.   Cardiovascular:      Rate and Rhythm: Regular rhythm. Tachycardia present.   Pulmonary:      Effort: Pulmonary effort is normal. No respiratory distress.       Breath sounds: Normal breath sounds.   Abdominal:      General: There is no distension.      Palpations: Abdomen is soft.      Tenderness: There is no abdominal tenderness.   Musculoskeletal:         General: No swelling or deformity. Normal range of motion.      Cervical back: Normal range of motion and neck supple.   Skin:     General: Skin is warm and dry.      Capillary Refill: Capillary refill takes less than 2 seconds.   Neurological:      General: No focal deficit present.      Mental Status: He is alert and oriented to person, place, and time. Mental status is at baseline.   Psychiatric:         Mood and Affect: Mood normal.         Behavior: Behavior normal.      Comments: Rapid speech but cooperative         ED Course & [facility]   ED Course as of 02/08/24 2108   Thu Feb 08, 2024   1404 Performed at  1404, HR of 155, NSR, NAD, QTc 404, no sign of STEMI or NSTEMI, no Q wave or T wave abnormality noted.    Reviewed and interpreted by me at time performed   [JM]   1604 Creatine Kinase: 88 [JM]   1604 Salicylate : <0 [JM]   1604 Acetaminophen: <5.0 [JM]   1604 POCT Lactate, Venous(!): 3.2 [JM]   1643 POCT Lactate, Venous(!): 2.7  Improved after IVF. [JM]   1813 Tablets include 200mg caffeine, 130mg Yerba Mate Leaf powder, green tea leaf extract, taurine, yohimbe bark extract, and 34mcg of chromium [JM]   1855 Amphetamine Screen, Urine: Negative [JM]   1855 Cocaine Metabolite Screen, Urine: Negative [JM]   1855 PCP Screen, Urine: Negative [JM]   1905 Temperature: 37.3 °C (99.2 °F) [JM]   1909 Patient on Propranolol 20mg daily and missed 4 doses.  Ordered home dose of Propranolol [JM]      ED Course User Index  [JM] Magali Vazquez MD         Diagnoses as of 02/08/24 2108   Accidental caffeine overdose, initial encounter (CMS/[facility])       Medical Decision Making  28-year-old presents with ingestion.  Considered drug intoxication, alcohol intoxication, suicide attempt.  Patient tachycardic and tremulous, most  consistent with a caffeine ingestion.  Caffeine tablet components documented within ED course.  Patient given 2 L of IV fluid without improvement in the heart rate but improvement in patient's lactic acid.  Normal CK, normal renal function.  Patient able to urinate without any hematuria.  Given concern for accidental sympathomimetic intoxication, patient treated with Ativan.  Patient also supposed to be on propranolol, has not been taking it recently.  Patient treated with home medication.  Patient heart rate improved after medication and IV fluids.  Patient still denies suicidal ideation.  Patient is linear, plans to take his home medications.  Patient discharged to self.        Procedure  Procedures     Magali Vazquez MD  02/08/24 5182

## 2024-02-09 NOTE — DISCHARGE INSTRUCTIONS
You are seen the emergency department today for caffeine intake.  You took an excessive amount of caffeine and this caused her heart rate to increase.  Please take your medications as prescribed.  If you have any other concerns, please return to emergency department for recheck.

## 2024-02-10 LAB
ATRIAL RATE: 156 BPM
P AXIS: 72 DEGREES
P OFFSET: 211 MS
P ONSET: 155 MS
PR INTERVAL: 130 MS
Q ONSET: 220 MS
QRS COUNT: 26 BEATS
QRS DURATION: 82 MS
QT INTERVAL: 252 MS
QTC CALCULATION(BAZETT): 404 MS
QTC FREDERICIA: 345 MS
R AXIS: 87 DEGREES
T AXIS: 9 DEGREES
T OFFSET: 346 MS
VENTRICULAR RATE: 155 BPM

## 2024-08-01 ENCOUNTER — APPOINTMENT (OUTPATIENT)
Dept: RADIOLOGY | Facility: HOSPITAL | Age: 29
End: 2024-08-01
Payer: COMMERCIAL

## 2024-08-01 ENCOUNTER — HOSPITAL ENCOUNTER (EMERGENCY)
Facility: HOSPITAL | Age: 29
Discharge: HOME | End: 2024-08-02
Attending: EMERGENCY MEDICINE
Payer: COMMERCIAL

## 2024-08-01 VITALS
HEIGHT: 73 IN | RESPIRATION RATE: 18 BRPM | DIASTOLIC BLOOD PRESSURE: 69 MMHG | SYSTOLIC BLOOD PRESSURE: 130 MMHG | OXYGEN SATURATION: 98 % | HEART RATE: 79 BPM | TEMPERATURE: 98.6 F | WEIGHT: 200 LBS | BODY MASS INDEX: 26.51 KG/M2

## 2024-08-01 DIAGNOSIS — T67.5XXA HEAT EXHAUSTION, INITIAL ENCOUNTER: Primary | ICD-10-CM

## 2024-08-01 DIAGNOSIS — N17.9 ACUTE RENAL FAILURE, UNSPECIFIED ACUTE RENAL FAILURE TYPE (CMS-HCC): ICD-10-CM

## 2024-08-01 LAB
ALBUMIN SERPL BCP-MCNC: 4.9 G/DL (ref 3.4–5)
ALP SERPL-CCNC: 55 U/L (ref 33–120)
ALT SERPL W P-5'-P-CCNC: 20 U/L (ref 10–52)
ANION GAP BLDV CALCULATED.4IONS-SCNC: 16 MMOL/L (ref 10–25)
ANION GAP SERPL CALC-SCNC: 12 MMOL/L (ref 10–20)
ANION GAP SERPL CALC-SCNC: 19 MMOL/L (ref 10–20)
APPEARANCE UR: ABNORMAL
AST SERPL W P-5'-P-CCNC: 24 U/L (ref 9–39)
BACTERIA #/AREA URNS AUTO: ABNORMAL /HPF
BASE EXCESS BLDV CALC-SCNC: -2.6 MMOL/L (ref -2–3)
BASOPHILS # BLD AUTO: 0.12 X10*3/UL (ref 0–0.1)
BASOPHILS NFR BLD AUTO: 0.7 %
BILIRUB DIRECT SERPL-MCNC: 0.3 MG/DL (ref 0–0.3)
BILIRUB SERPL-MCNC: 1.2 MG/DL (ref 0–1.2)
BILIRUB UR STRIP.AUTO-MCNC: NEGATIVE MG/DL
BODY TEMPERATURE: 37 DEGREES CELSIUS
BUN SERPL-MCNC: 27 MG/DL (ref 6–23)
BUN SERPL-MCNC: 27 MG/DL (ref 6–23)
CA-I BLDV-SCNC: 1.28 MMOL/L (ref 1.1–1.33)
CALCIUM SERPL-MCNC: 10.3 MG/DL (ref 8.6–10.3)
CALCIUM SERPL-MCNC: 8.3 MG/DL (ref 8.6–10.3)
CHLORIDE BLDV-SCNC: 101 MMOL/L (ref 98–107)
CHLORIDE SERPL-SCNC: 100 MMOL/L (ref 98–107)
CHLORIDE SERPL-SCNC: 105 MMOL/L (ref 98–107)
CK SERPL-CCNC: 235 U/L (ref 0–325)
CO2 SERPL-SCNC: 21 MMOL/L (ref 21–32)
CO2 SERPL-SCNC: 23 MMOL/L (ref 21–32)
COLOR UR: YELLOW
CREAT SERPL-MCNC: 1.31 MG/DL (ref 0.5–1.3)
CREAT SERPL-MCNC: 2 MG/DL (ref 0.5–1.3)
EGFRCR SERPLBLD CKD-EPI 2021: 46 ML/MIN/1.73M*2
EGFRCR SERPLBLD CKD-EPI 2021: 76 ML/MIN/1.73M*2
EOSINOPHIL # BLD AUTO: 0.06 X10*3/UL (ref 0–0.7)
EOSINOPHIL NFR BLD AUTO: 0.4 %
ERYTHROCYTE [DISTWIDTH] IN BLOOD BY AUTOMATED COUNT: 12.3 % (ref 11.5–14.5)
GLUCOSE BLDV-MCNC: 100 MG/DL (ref 74–99)
GLUCOSE SERPL-MCNC: 101 MG/DL (ref 74–99)
GLUCOSE SERPL-MCNC: 98 MG/DL (ref 74–99)
GLUCOSE UR STRIP.AUTO-MCNC: NORMAL MG/DL
HCO3 BLDV-SCNC: 21.8 MMOL/L (ref 22–26)
HCT VFR BLD AUTO: 44.7 % (ref 41–52)
HCT VFR BLD EST: 48 % (ref 41–52)
HGB BLD-MCNC: 15.6 G/DL (ref 13.5–17.5)
HGB BLDV-MCNC: 16.1 G/DL (ref 13.5–17.5)
HYALINE CASTS #/AREA URNS AUTO: ABNORMAL /LPF
IMM GRANULOCYTES # BLD AUTO: 0.12 X10*3/UL (ref 0–0.7)
IMM GRANULOCYTES NFR BLD AUTO: 0.7 % (ref 0–0.9)
INHALED O2 CONCENTRATION: 21 %
KETONES UR STRIP.AUTO-MCNC: ABNORMAL MG/DL
LACTATE BLDV-SCNC: 2.8 MMOL/L (ref 0.4–2)
LACTATE BLDV-SCNC: 3 MMOL/L (ref 0.4–2)
LEUKOCYTE ESTERASE UR QL STRIP.AUTO: NEGATIVE
LYMPHOCYTES # BLD AUTO: 1.31 X10*3/UL (ref 1.2–4.8)
LYMPHOCYTES NFR BLD AUTO: 8 %
MAGNESIUM SERPL-MCNC: 1.7 MG/DL (ref 1.6–2.4)
MCH RBC QN AUTO: 32 PG (ref 26–34)
MCHC RBC AUTO-ENTMCNC: 34.9 G/DL (ref 32–36)
MCV RBC AUTO: 92 FL (ref 80–100)
MONOCYTES # BLD AUTO: 0.92 X10*3/UL (ref 0.1–1)
MONOCYTES NFR BLD AUTO: 5.6 %
MUCOUS THREADS #/AREA URNS AUTO: ABNORMAL /LPF
NEUTROPHILS # BLD AUTO: 13.79 X10*3/UL (ref 1.2–7.7)
NEUTROPHILS NFR BLD AUTO: 84.6 %
NITRITE UR QL STRIP.AUTO: NEGATIVE
NRBC BLD-RTO: 0 /100 WBCS (ref 0–0)
OXYHGB MFR BLDV: 46.5 % (ref 45–75)
PCO2 BLDV: 36 MM HG (ref 41–51)
PH BLDV: 7.39 PH (ref 7.33–7.43)
PH UR STRIP.AUTO: 6 [PH]
PHOSPHATE SERPL-MCNC: 3.1 MG/DL (ref 2.5–4.9)
PLATELET # BLD AUTO: 307 X10*3/UL (ref 150–450)
PO2 BLDV: 30 MM HG (ref 35–45)
POTASSIUM BLDV-SCNC: 4.2 MMOL/L (ref 3.5–5.3)
POTASSIUM SERPL-SCNC: 4.2 MMOL/L (ref 3.5–5.3)
POTASSIUM SERPL-SCNC: 4.3 MMOL/L (ref 3.5–5.3)
PROT SERPL-MCNC: 7.6 G/DL (ref 6.4–8.2)
PROT UR STRIP.AUTO-MCNC: ABNORMAL MG/DL
RBC # BLD AUTO: 4.87 X10*6/UL (ref 4.5–5.9)
RBC # UR STRIP.AUTO: NEGATIVE /UL
RBC #/AREA URNS AUTO: ABNORMAL /HPF
SAO2 % BLDV: 48 % (ref 45–75)
SODIUM BLDV-SCNC: 135 MMOL/L (ref 136–145)
SODIUM SERPL-SCNC: 136 MMOL/L (ref 136–145)
SODIUM SERPL-SCNC: 136 MMOL/L (ref 136–145)
SP GR UR STRIP.AUTO: 1.03
SQUAMOUS #/AREA URNS AUTO: ABNORMAL /HPF
UROBILINOGEN UR STRIP.AUTO-MCNC: ABNORMAL MG/DL
WBC # BLD AUTO: 16.3 X10*3/UL (ref 4.4–11.3)
WBC #/AREA URNS AUTO: ABNORMAL /HPF

## 2024-08-01 PROCEDURE — 82550 ASSAY OF CK (CPK): CPT | Performed by: EMERGENCY MEDICINE

## 2024-08-01 PROCEDURE — 84132 ASSAY OF SERUM POTASSIUM: CPT | Performed by: EMERGENCY MEDICINE

## 2024-08-01 PROCEDURE — 96361 HYDRATE IV INFUSION ADD-ON: CPT

## 2024-08-01 PROCEDURE — 96374 THER/PROPH/DIAG INJ IV PUSH: CPT | Mod: 59

## 2024-08-01 PROCEDURE — 74176 CT ABD & PELVIS W/O CONTRAST: CPT

## 2024-08-01 PROCEDURE — 36415 COLL VENOUS BLD VENIPUNCTURE: CPT | Performed by: EMERGENCY MEDICINE

## 2024-08-01 PROCEDURE — 83735 ASSAY OF MAGNESIUM: CPT | Performed by: EMERGENCY MEDICINE

## 2024-08-01 PROCEDURE — 99291 CRITICAL CARE FIRST HOUR: CPT | Performed by: EMERGENCY MEDICINE

## 2024-08-01 PROCEDURE — 2500000004 HC RX 250 GENERAL PHARMACY W/ HCPCS (ALT 636 FOR OP/ED): Performed by: EMERGENCY MEDICINE

## 2024-08-01 PROCEDURE — 84100 ASSAY OF PHOSPHORUS: CPT | Performed by: EMERGENCY MEDICINE

## 2024-08-01 PROCEDURE — 85025 COMPLETE CBC W/AUTO DIFF WBC: CPT | Performed by: EMERGENCY MEDICINE

## 2024-08-01 PROCEDURE — 84132 ASSAY OF SERUM POTASSIUM: CPT | Mod: 59 | Performed by: EMERGENCY MEDICINE

## 2024-08-01 PROCEDURE — 84075 ASSAY ALKALINE PHOSPHATASE: CPT | Performed by: EMERGENCY MEDICINE

## 2024-08-01 PROCEDURE — 83605 ASSAY OF LACTIC ACID: CPT | Performed by: EMERGENCY MEDICINE

## 2024-08-01 PROCEDURE — 74176 CT ABD & PELVIS W/O CONTRAST: CPT | Performed by: STUDENT IN AN ORGANIZED HEALTH CARE EDUCATION/TRAINING PROGRAM

## 2024-08-01 PROCEDURE — 81001 URINALYSIS AUTO W/SCOPE: CPT | Performed by: EMERGENCY MEDICINE

## 2024-08-01 RX ORDER — ONDANSETRON HYDROCHLORIDE 2 MG/ML
4 INJECTION, SOLUTION INTRAVENOUS ONCE
Status: COMPLETED | OUTPATIENT
Start: 2024-08-01 | End: 2024-08-01

## 2024-08-01 RX ADMIN — SODIUM CHLORIDE 1000 ML: 9 INJECTION, SOLUTION INTRAVENOUS at 20:50

## 2024-08-01 RX ADMIN — ONDANSETRON 4 MG: 2 INJECTION INTRAMUSCULAR; INTRAVENOUS at 16:45

## 2024-08-01 RX ADMIN — SODIUM CHLORIDE, SODIUM LACTATE, POTASSIUM CHLORIDE, AND CALCIUM CHLORIDE 2000 ML: 600; 310; 30; 20 INJECTION, SOLUTION INTRAVENOUS at 16:45

## 2024-08-01 ASSESSMENT — PAIN DESCRIPTION - PROGRESSION: CLINICAL_PROGRESSION: NOT CHANGED

## 2024-08-01 ASSESSMENT — PAIN DESCRIPTION - PAIN TYPE: TYPE: ACUTE PAIN

## 2024-08-01 ASSESSMENT — PAIN - FUNCTIONAL ASSESSMENT: PAIN_FUNCTIONAL_ASSESSMENT: 0-10

## 2024-08-01 ASSESSMENT — PAIN SCALES - GENERAL
PAINLEVEL_OUTOF10: 3
PAINLEVEL_OUTOF10: 0 - NO PAIN

## 2024-08-01 ASSESSMENT — PAIN DESCRIPTION - LOCATION: LOCATION: LEG

## 2024-08-01 NOTE — ED TRIAGE NOTES
Patient presents to ED with possible heat injury. Patient works outside all day in sun. Patient works in [employer] and cannot rule out possibility of touching something that may have caused this reaction. Patient appears very sweaty patient also having cramping in legs. Patient given cold packs and cold rag to assist with comfort. 18g and LR was established by ems en route to hospital.

## 2024-08-02 LAB — HOLD SPECIMEN: NORMAL

## 2024-08-02 NOTE — ED PROVIDER NOTES
HPI   Chief Complaint   Patient presents with    Heat Exposure     Patient possible heat injury works outside doing [employer]         HPI: []  29-year-old white male with no medical history comes in with a severe abdominal pain abdominal cramping and vomiting for the last few hours.  He states he works as a  he has been outdoors for the last about 8 hours working on his yard and while he was working he became severely nauseous had severe abdominal cramping of his muscles and started vomiting.  Became very diaphoretic.  He has been outdoors for the last few days around working in the yard as a .  He had no diarrhea.  No fever no chills.  No chest pain pressure heaviness.  No cough or congestion.  Headache.  No syncope or near syncope.  No urine frequency urgency hematuria.  He feels weak and tired.  His abdominal muscles are cramping.    Past history: None  Social: Patient denies a current tobacco alcohol drug abuse.  REVIEW OF SYSTEMS:    GENERAL.: No weight loss, fatigue, anorexia, insomnia, fever.    EYES: No vision loss, double vision, drainage, eye pain.    ENT: No pharyngitis, dry mouth.    CARDIOPULMONARY: No chest pain, palpitations, syncope, near syncope. No shortness of breath, cough, hemoptysis.    GI: No abdominal pain, change in bowel habits, melena, hematemesis, hematochezia, nausea, positive for vomiting, diarrhea.  Positive abdominal wall cramping    : No discharge, dysuria, frequency, urgency, hematuria.    MS: No limb pain, joint pain, joint swelling.    SKIN: No rashes.    PSYCH: No depression, anxiety, suicidality, homicidality.    Review of systems is otherwise negative unless stated above or in history of present illness.  Social history, family history, allergies reviewed.  PHYSICAL EXAM:    GENERAL: Vitals noted, moderate distress. Alert and oriented  x 3. Non-toxic.  Drenched in sweat    EENT: TMs clear. Posterior oropharynx unremarkable. No meningismus. No LAD.      NECK: Supple. Nontender. No midline tenderness.     CARDIAC: Regular, rate, rhythm. No murmurs rubs or gallops. No JVD    PULMONARY: Lungs clear bilaterally with good aeration. No wheezes rales or rhonchi. No respiratory distress.     ABDOMEN: Soft, nonsurgical. Nontender. No peritoneal signs. Normoactive bowel sounds. No pulsatile masses.  Negative Ivy sign negative McBurney point tenderness no CVA tenderness.    EXTREMITIES: No peripheral edema. Negative Homans bilaterally, no cords.  2+ bounding pulses well-perfused.    SKIN: No rash. Intact.     NEURO: No focal neurologic deficits, NIH score of 0. Cranial nerves normal as tested from II through XII.     MEDICAL DECISION MAKING:  CBC with differential shows leukocytosis 13,000, his chemistry show acute renal failure, CPK is normal, UA shows some WBCs but negative nitrate negative esterase abdominal CAT scan fairly negative.  Lactate slightly elevated.    Treatment: IV established given 3 L of fluids, also received intravenous Zofran  ED course: Repeat assessment feeling remarked better repeat chemistries showed improved kidney function improved GFR, and feels remarkably remains normotensive no tachycardia or hypoxia.  He passed a p.o. challenge.    Impression: #1 acute renal failure, #2 heat exhaustion acute  Plan/MDM: 29-year-old white male who works as a  has been working outdoors in 90 degree heat for last few days and for about 8 hours the today comes in with a severe abdominal cramping and vomiting and severe diaphoresis his face examination history and workup is consistent with acute heat exhaustion and acute renal failure due to hypovolemia and dehydration low concern for septic shock sepsis or any other catastrophic pathology.  His kidney function is improving GFR improving after hydration patient feels well he remains normotensive no tachycardia hypoxia passed a p.o. challenge will be discharged home advised abortive care aggressive  hydration advised to stay away from heat, advised repeat chemistries in 48 hours with strict return precaution.              Patient History   Past Medical History:   Diagnosis Date    Schizophrenia (Multi)     Substance abuse (Multi)      No past surgical history on file.  No family history on file.  Social History     Tobacco Use    Smoking status: Never    Smokeless tobacco: Never   Substance Use Topics    Alcohol use: Not on file    Drug use: Not on file       Physical Exam   ED Triage Vitals [08/01/24 1626]   Temperature Heart Rate Respirations BP   37 °C (98.6 °F) 90 16 135/66      Pulse Ox Temp src Heart Rate Source Patient Position   99 % -- -- --      BP Location FiO2 (%)     Left arm --       Physical Exam      ED Course & MDM   ED Course as of 08/02/24 0036   Fri Aug 02, 2024   0023 CBC shows leukocytosis at 16,000, chemistries show acute renal failure, lactate elevated, CPK is normal, magnesium normal, abdominal CAT scan fairly negative.  Patient received 3 L of fluids.,  Repeat chemistries improving.  Patient feels better looks better normotensive no tachycardia hypoxia will be discharged home but aware of the abnormal findings advised supportive care advised avoid exposure and heat, aggressive hydration close outpatient follow-up repeat chemistries in 48 hours with strict return precaution. [MT]      ED Course User Index  [MT] Bhumi Rachel MD         Diagnoses as of 08/02/24 0036   Heat exhaustion, initial encounter   Acute renal failure, unspecified acute renal failure type (CMS-HCC)                       No data recorded                      Medical Decision Making      Procedure  Critical Care    Performed by: Bhumi Rachel MD  Authorized by: Bhumi Rachel MD    Critical care provider statement:     Critical care time (minutes):  35    Critical care time was exclusive of:  Separately billable procedures and treating other patients    Critical care was necessary to treat or prevent  imminent or life-threatening deterioration of the following conditions:  Renal failure    Critical care was time spent personally by me on the following activities:  Blood draw for specimens, development of treatment plan with patient or surrogate, evaluation of patient's response to treatment, examination of patient, review of old charts, re-evaluation of patient's condition, pulse oximetry, ordering and review of radiographic studies, ordering and review of laboratory studies and ordering and performing treatments and interventions    Care discussed with comment:  Discharged       Bhumi Rachel MD  08/02/24 0041

## 2024-08-02 NOTE — DISCHARGE INSTRUCTIONS
Please avoid heat, aggressive hydration, repeat kidney function evaluation on Monday by her primary care doctor.

## 2024-09-05 ENCOUNTER — APPOINTMENT (OUTPATIENT)
Dept: PRIMARY CARE | Facility: CLINIC | Age: 29
End: 2024-09-05
Payer: COMMERCIAL

## 2024-10-07 ENCOUNTER — APPOINTMENT (OUTPATIENT)
Dept: PRIMARY CARE | Facility: CLINIC | Age: 29
End: 2024-10-07
Payer: COMMERCIAL

## 2024-10-07 VITALS
TEMPERATURE: 98.1 F | OXYGEN SATURATION: 99 % | HEART RATE: 86 BPM | DIASTOLIC BLOOD PRESSURE: 82 MMHG | HEIGHT: 74 IN | WEIGHT: 189.2 LBS | BODY MASS INDEX: 24.28 KG/M2 | SYSTOLIC BLOOD PRESSURE: 122 MMHG

## 2024-10-07 DIAGNOSIS — F15.10: ICD-10-CM

## 2024-10-07 DIAGNOSIS — R41.840 INATTENTION: Primary | ICD-10-CM

## 2024-10-07 PROBLEM — F20.9 SCHIZOPHRENIA: Chronic | Status: ACTIVE | Noted: 2023-02-27

## 2024-10-07 PROCEDURE — 3008F BODY MASS INDEX DOCD: CPT

## 2024-10-07 PROCEDURE — 1036F TOBACCO NON-USER: CPT

## 2024-10-07 PROCEDURE — 99203 OFFICE O/P NEW LOW 30 MIN: CPT

## 2024-10-07 ASSESSMENT — PATIENT HEALTH QUESTIONNAIRE - PHQ9
2. FEELING DOWN, DEPRESSED OR HOPELESS: NOT AT ALL
1. LITTLE INTEREST OR PLEASURE IN DOING THINGS: NOT AT ALL
SUM OF ALL RESPONSES TO PHQ9 QUESTIONS 1 AND 2: 0

## 2024-10-07 ASSESSMENT — PAIN SCALES - GENERAL: PAINLEVEL: 0-NO PAIN

## 2024-10-07 NOTE — PROGRESS NOTES
"Subjective     Patient ID: David Smith is a 29 y.o. male who presents for New Patient Visit.      HPI  David presents as a new patient to this provider for concerns that he may have ADHD.  He has complaints of difficulty with focus and attention span.  He admits it is difficult to stay on task at work.  He works as a  currently, works around heavy equipment and is concerned that his inattentiveness may cause issues while working with heavy machinery.  Admits to drinking several cans of Bang energy drinks to help with energy and focus, although, that has not been helpful.   Admits to seeing [facility] provider, states he was diagnosed with schizophrenia.  He reports he does not agree with that diagnoses and has not returned to [facility] for any further appointment.  He is not currently taking an medications for mood stabilization. He denies nicotine use, recreational drug use.  Admits to increased caffeine consumption. Exercises daily for health and energy.       Patient's recent visit notes, medication and allergy lists, past medical surgical social hx, immunization, vitals, problem list, recent tests were reviewed by me for pertinence to this visit.  No current outpatient medications on file.      Review of Systems  All other systems have been reviewed and are negative except as noted in the HPI.         Objective   /82 (BP Location: Left arm, Patient Position: Sitting, BP Cuff Size: Adult)   Pulse 86   Temp 36.7 °C (98.1 °F) (Temporal)   Ht 1.88 m (6' 2\")   Wt 85.8 kg (189 lb 3.2 oz)   SpO2 99%   BMI 24.29 kg/m²       Physical Exam  Vitals and nursing note reviewed.   Constitutional:       General: He is not in acute distress.     Appearance: Normal appearance. He is normal weight. He is not ill-appearing.   Neck:      Vascular: No carotid bruit.   Cardiovascular:      Rate and Rhythm: Normal rate and regular rhythm.      Pulses: Normal pulses.      Heart sounds: Normal heart " sounds, S1 normal and S2 normal. No murmur heard.  Pulmonary:      Effort: Pulmonary effort is normal. No respiratory distress.      Breath sounds: Normal breath sounds and air entry.   Musculoskeletal:      Cervical back: Normal range of motion and neck supple. No rigidity or tenderness.      Right lower leg: No edema.      Left lower leg: No edema.   Lymphadenopathy:      Cervical: No cervical adenopathy.   Skin:     General: Skin is warm and dry.      Capillary Refill: Capillary refill takes less than 2 seconds.   Neurological:      General: No focal deficit present.      Mental Status: He is alert and oriented to person, place, and time.      Cranial Nerves: Cranial nerves 2-12 are intact.   Psychiatric:         Attention and Perception: Attention and perception normal.         Mood and Affect: Mood and affect normal.         Speech: Speech normal.         Behavior: Behavior normal. Behavior is cooperative.         Thought Content: Thought content normal.         Cognition and Memory: Cognition normal.         Judgment: Judgment normal.             Assessment & Plan  Inattention  Appears to be an ongoing concern for many years.   Referral to neuro psych for further evaluation/ADHD testing.   Discussed lifestyle interventions for symptoms management aside from pharmacological interventions.  Continue with non-pharmacological interventions including:  -7-9 hours of sleep   -healthy diet  -exercise 30 minutes 5 days per week  - consider counseling, CBT as adjunct to medication.  Follow up with neuropsych as discussed    Orders:    Referral to Adult Neuropsychology; Future    Excessive caffeine abuse, continuous (Multi)  Discussed importance of decreasing caffeine consumption  Discussed impact of caffeine on general health as well as mental health, with side effects of palpitations, increased anxiety, decreased ability focus with over consumption of caffeine. We discussed that caffeine are likely contributing to his  symptoms and a weaning schedule to reduce intake. Follow up in 1-2 months for re-evaluation as needed.              Patient understands and agrees with treatment plan.    Robyn Gage, APRN-CNP

## 2025-04-24 ENCOUNTER — APPOINTMENT (OUTPATIENT)
Dept: PSYCHOLOGY | Facility: HOSPITAL | Age: 30
End: 2025-04-24
Payer: COMMERCIAL